# Patient Record
(demographics unavailable — no encounter records)

---

## 2024-11-26 NOTE — ASSESSMENT
[FreeTextEntry1] :   -F/u labs drawn in office today -Further recs pending lab results -RTO 4mo CPE or sooner if needed

## 2024-11-26 NOTE — HEALTH RISK ASSESSMENT
[0] : 2) Feeling down, depressed, or hopeless: Not at all (0) [PHQ-2 Negative - No further assessment needed] : PHQ-2 Negative - No further assessment needed [[id number]] : 0 [Reviewed no changes] : Reviewed, no changes [AdvancecareDate] : 04/24 [Never] : Never

## 2024-11-26 NOTE — HISTORY OF PRESENT ILLNESS
[FreeTextEntry1] : HTN, HLD, Gait instability, Obesity, Osteoporosis screening, Prevnar 20 [de-identified] : -PMH: HTN, HLD -SH: . 5 children. Lives w/ her son &  in a 2 floor home. Non-smoker. Occasional EtOH use.  BETTY is a 96 year F whom is here today for follow up HTN, HLD, Gait instability, Obesity Today, pt reports feeling well and is w/o complaints.   -HTN: Remains on Metoprolol 25mg QD -HLD: Diet controlled

## 2025-05-01 NOTE — HEALTH RISK ASSESSMENT
[0] : 2) Feeling down, depressed, or hopeless: Not at all (0) [PHQ-2 Negative - No further assessment needed] : PHQ-2 Negative - No further assessment needed [Reviewed no changes] : Reviewed, no changes [Never] : Never [[id number]] : 0 [AdvancecareDate] : 04/24

## 2025-05-01 NOTE — HISTORY OF PRESENT ILLNESS
[FreeTextEntry1] : HTN, HLD, Gait instability [de-identified] : -PMH: HTN, HLD -SH: . 5 children. Lives w/ her son &  in a 2 floor home. Non-smoker. Occasional EtOH use.  BETTY is a 96 year F whom is here today for follow up HTN, HLD, Gait instability Today, pt reports feeling well and is w/o complaints.   -HTN: Remains on Metoprolol 25mg QD -HLD: Diet controlled

## 2025-05-01 NOTE — HISTORY OF PRESENT ILLNESS
[FreeTextEntry1] : HTN, HLD, Gait instability [de-identified] : -PMH: HTN, HLD -SH: . 5 children. Lives w/ her son &  in a 2 floor home. Non-smoker. Occasional EtOH use.  BETTY is a 96 year F whom is here today for follow up HTN, HLD, Gait instability Today, pt reports feeling well and is w/o complaints.   -HTN: Remains on Metoprolol 25mg QD -HLD: Diet controlled skin

## 2025-05-01 NOTE — HEALTH RISK ASSESSMENT
[0] : 2) Feeling down, depressed, or hopeless: Not at all (0) [PHQ-2 Negative - No further assessment needed] : PHQ-2 Negative - No further assessment needed [Reviewed no changes] : Reviewed, no changes [Never] : Never [[id number]] : 0 [AdvancecareDate] : 04/24

## 2025-05-01 NOTE — ASSESSMENT
[FreeTextEntry1] :  -F/u labs drawn in office today -Further recs pending lab results -RTO 6mo CPE or sooner if needed

## 2025-05-01 NOTE — ADDENDUM
[FreeTextEntry1] : Patient's LDL cholesterol remains elevated however given age would not suggest starting her on a statin at this time which is suggest we monitor She is nearing deficiency in vitamin B12 as well as folic acid and I would therefore recommend she begin over-the-counter folic acid 500 mg daily and B12 500 mcg daily